# Patient Record
Sex: FEMALE | Race: WHITE | URBAN - METROPOLITAN AREA
[De-identification: names, ages, dates, MRNs, and addresses within clinical notes are randomized per-mention and may not be internally consistent; named-entity substitution may affect disease eponyms.]

---

## 2024-10-05 ENCOUNTER — OFFICE VISIT (OUTPATIENT)
Age: 44
End: 2024-10-05

## 2024-10-05 VITALS
RESPIRATION RATE: 18 BRPM | HEART RATE: 87 BPM | HEIGHT: 69 IN | TEMPERATURE: 98 F | BODY MASS INDEX: 21.92 KG/M2 | DIASTOLIC BLOOD PRESSURE: 92 MMHG | SYSTOLIC BLOOD PRESSURE: 130 MMHG | WEIGHT: 148 LBS | OXYGEN SATURATION: 98 %

## 2024-10-05 DIAGNOSIS — H60.502 ACUTE OTITIS EXTERNA OF LEFT EAR, UNSPECIFIED TYPE: Primary | ICD-10-CM

## 2024-10-05 DIAGNOSIS — R03.0 ELEVATED BLOOD PRESSURE READING: ICD-10-CM

## 2024-10-05 PROBLEM — M79.10 MYALGIA: Status: ACTIVE | Noted: 2024-10-05

## 2024-10-05 PROBLEM — E03.8 OTHER SPECIFIED HYPOTHYROIDISM: Status: ACTIVE | Noted: 2017-09-22

## 2024-10-05 PROBLEM — J30.89 ENVIRONMENTAL AND SEASONAL ALLERGIES: Status: ACTIVE | Noted: 2024-10-05

## 2024-10-05 PROBLEM — G43.709 CHRONIC MIGRAINE W/O AURA, NOT INTRACTABLE, W/O STAT MIGR: Status: ACTIVE | Noted: 2018-06-07

## 2024-10-05 PROBLEM — Z86.69 HISTORY OF CHIARI MALFORMATION: Status: ACTIVE | Noted: 2018-06-07

## 2024-10-05 PROBLEM — E66.812 CLASS 2 OBESITY WITH BODY MASS INDEX (BMI) OF 37.0 TO 37.9 IN ADULT: Status: ACTIVE | Noted: 2018-08-10

## 2024-10-05 RX ORDER — ESCITALOPRAM OXALATE 10 MG/1
10 TABLET ORAL DAILY
COMMUNITY

## 2024-10-05 RX ORDER — CIPROFLOXACIN AND DEXAMETHASONE 3; 1 MG/ML; MG/ML
4 SUSPENSION/ DROPS AURICULAR (OTIC) 2 TIMES DAILY
Qty: 7.5 ML | Refills: 0 | Status: SHIPPED | OUTPATIENT
Start: 2024-10-05 | End: 2024-10-12

## 2024-10-05 RX ORDER — LEVOTHYROXINE SODIUM 150 UG/1
150 TABLET ORAL DAILY
COMMUNITY

## 2024-10-05 NOTE — PATIENT INSTRUCTIONS
Thank you for visiting [facility] today.    Prevention:  -Use vinegar with water, peroxide or rubbing alcohol in ears and let drain  -Dry ear with hairdryer set on low and approximately 12 inches away from ears  Bacterial Infection:   -Instill antibiotic ear drops as prescribed  Ear pain/fever:    -If you can take NSAIDs, take ibuprofen every 8 hours as needed  -If you cannot take NSAIDs, take Tylenol every 6 hours as needed  General Counseling:  -Proper ear hygiene:  “don't put anything smaller than your elbow in your ear” to clean the ear canal.  Ear canal is self cleaning and fingers, towels, cotton swabs, or other foreign objects should not be inserted in ear.    -The ear should be protected from water during recovery.  This can be accomplished by placing a cotton ball coated with petroleum jelly in the ear canal while bathing.  -No swimming  -Hearing aid and earphones should not be worn until pain and discharge have subsided    Please follow up with your PCP within 2-5 days if your signs and symptoms have not resolved or worsened.    Please go immediately to the ER if you develop severe hearing loss, intractable pain or uncontrollable fever above 100.4F.     DASH Diet: After Your Visit  Your Care Instructions  The DASH diet is an eating plan that can help lower your blood pressure. DASH stands for Dietary Approaches to Stop Hypertension. Hypertension is high blood pressure.  The DASH diet focuses on eating foods that are high in calcium, potassium, and magnesium. These nutrients can lower blood pressure. The foods that are highest in these nutrients are fruits, vegetables, low-fat dairy products, nuts, seeds, and legumes. But taking calcium, potassium, and magnesium supplements instead of eating foods that are high in those nutrients does not have the same effect. The DASH diet also includes whole grains, fish, and poultry.  The DASH diet is one of several lifestyle changes your doctor may

## 2024-10-05 NOTE — PROGRESS NOTES
Subjective     Chief Complaint   Patient presents with    Otalgia     Both ears clogged and painful. Sometimes there is discharge when sleeping.        HPI    History reviewed. No pertinent past medical history.    History reviewed. No pertinent surgical history.    History reviewed. No pertinent family history.    No Known Allergies    Social History     Tobacco Use    Smoking status: Never    Smokeless tobacco: Never   Substance Use Topics    Alcohol use: Not Currently    Drug use: Never       Vitals:    10/05/24 1731   BP: (!) 130/92   Pulse:    Resp:    Temp:    SpO2:        Review of Systems   Constitutional:  Negative for chills and fever.   HENT:  Positive for ear discharge and ear pain.        Objective     Physical Exam  Vitals and nursing note reviewed.   Constitutional:       General: She is not in acute distress.     Appearance: Normal appearance. She is not ill-appearing.   HENT:      Head: Normocephalic and atraumatic.      Right Ear: Tympanic membrane, ear canal and external ear normal.      Left Ear: External ear normal. Swelling present. No drainage.   Neurological:      Mental Status: She is alert.         Assessment & Plan     Diagnoses and all orders for this visit:  Acute otitis externa of left ear, unspecified type  Elevated blood pressure reading  -     Ambulatory referral to Internal Medicine  Other orders  -     ciprofloxacin-dexAMETHasone (CIPRODEX) 0.3-0.1 % otic suspension; Place 4 drops into the left ear 2 times daily for 7 days      No orders to display   Patient is previously healthy and immunocompetent, presenting with otitis externa.  There is no evidence for significant complications at this time.  Patient is alert and completely non-toxic.  There is no evidence for tympanic membrane rupture.   There is no mastoid swelling.  There is no evidence for otitis media based on examination.  There is no suggestion of systemic complications or sepsis.  I feel a trial of outpatient

## 2025-02-14 ENCOUNTER — TELEPHONE (OUTPATIENT)
Age: 45
End: 2025-02-14

## 2025-02-14 NOTE — TELEPHONE ENCOUNTER
Patient called in stating she was seen at an ED in [place], [place] and would like to get scheduled with a general surgeon at [place] for her gallbladder. Patient stated she had labs and an ultrasound in the ED showing gallstones in the gallbladder. Advised patient we would need the ED visit  notes and the results of the ultrasound prior to scheduling. Advised first available appt for an in office consultation with the surgeon is about 3 weeks out. Patient stated she understood, that she had our fax # and would have those be sent over.